# Patient Record
Sex: MALE | Race: WHITE | Employment: FULL TIME | URBAN - NONMETROPOLITAN AREA
[De-identification: names, ages, dates, MRNs, and addresses within clinical notes are randomized per-mention and may not be internally consistent; named-entity substitution may affect disease eponyms.]

---

## 2017-04-18 ENCOUNTER — OFFICE VISIT (OUTPATIENT)
Dept: UROLOGY | Age: 42
End: 2017-04-18
Payer: COMMERCIAL

## 2017-04-18 VITALS
DIASTOLIC BLOOD PRESSURE: 82 MMHG | WEIGHT: 264 LBS | SYSTOLIC BLOOD PRESSURE: 152 MMHG | OXYGEN SATURATION: 97 % | HEIGHT: 66 IN | HEART RATE: 70 BPM | TEMPERATURE: 97.5 F | BODY MASS INDEX: 42.43 KG/M2

## 2017-04-18 DIAGNOSIS — F52.4 PREMATURE EJACULATION: Primary | ICD-10-CM

## 2017-04-18 DIAGNOSIS — N52.9 ERECTILE DYSFUNCTION, UNSPECIFIED ERECTILE DYSFUNCTION TYPE: ICD-10-CM

## 2017-04-18 LAB
BILIRUBIN, POC: NORMAL
BLOOD URINE, POC: NORMAL
CLARITY, POC: NORMAL
COLOR, POC: NORMAL
GLUCOSE URINE, POC: NORMAL
KETONES, POC: NORMAL
LEUKOCYTE EST, POC: NORMAL
NITRITE, POC: NORMAL
PH, POC: 5.5
PROTEIN, POC: NORMAL
SPECIFIC GRAVITY, POC: 1.02
UROBILINOGEN, POC: 0.2

## 2017-04-18 PROCEDURE — 81002 URINALYSIS NONAUTO W/O SCOPE: CPT | Performed by: PHYSICIAN ASSISTANT

## 2017-04-18 PROCEDURE — 99213 OFFICE O/P EST LOW 20 MIN: CPT | Performed by: PHYSICIAN ASSISTANT

## 2017-04-18 ASSESSMENT — ENCOUNTER SYMPTOMS
COUGH: 0
EYE REDNESS: 0
HEMOPTYSIS: 0
EYE DISCHARGE: 0
BLOOD IN STOOL: 0

## 2017-10-18 ENCOUNTER — OFFICE VISIT (OUTPATIENT)
Dept: UROLOGY | Age: 42
End: 2017-10-18
Payer: COMMERCIAL

## 2017-10-18 VITALS
TEMPERATURE: 97.4 F | WEIGHT: 264 LBS | BODY MASS INDEX: 42.43 KG/M2 | HEART RATE: 76 BPM | OXYGEN SATURATION: 95 % | HEIGHT: 66 IN

## 2017-10-18 DIAGNOSIS — F52.4 PREMATURE EJACULATION: Primary | ICD-10-CM

## 2017-10-18 DIAGNOSIS — N52.1 ERECTILE DYSFUNCTION DUE TO DISEASES CLASSIFIED ELSEWHERE: ICD-10-CM

## 2017-10-18 LAB
BILIRUBIN, POC: NORMAL
BLOOD URINE, POC: NORMAL
CLARITY, POC: CLEAR
COLOR, POC: YELLOW
GLUCOSE URINE, POC: NORMAL
KETONES, POC: NORMAL
LEUKOCYTE EST, POC: NORMAL
NITRITE, POC: NORMAL
PH, POC: 5.5
PROTEIN, POC: NORMAL
SPECIFIC GRAVITY, POC: 1.02
UROBILINOGEN, POC: 0.2

## 2017-10-18 PROCEDURE — 81003 URINALYSIS AUTO W/O SCOPE: CPT | Performed by: PHYSICIAN ASSISTANT

## 2017-10-18 PROCEDURE — 99213 OFFICE O/P EST LOW 20 MIN: CPT | Performed by: PHYSICIAN ASSISTANT

## 2017-10-18 ASSESSMENT — ENCOUNTER SYMPTOMS
BLURRED VISION: 0
EYE PAIN: 0
VOMITING: 0
BACK PAIN: 0
SINUS PAIN: 0
ABDOMINAL PAIN: 0
SHORTNESS OF BREATH: 0
WHEEZING: 0

## 2017-10-18 NOTE — PROGRESS NOTES
Jaiden Justice is a 40 y.o. male who presents today   Chief Complaint   Patient presents with    Follow-up     i am here to fu on premature ejaculation      HPI:  Patient here for 6 month follow-up on premature ejaculation and testalgia. He states the Zoloft continues to work well with premature ejaculation. Patient a refill today which admitted. He has have issues with maintaining erection has tried Cialis [place] he stated that the Viagra works best for him I offered prescription for this today but he states he gets it through another pharmacy cheaper. Patient also stated he used to see Dr. Leigh Quarles for testosterone replacement he is going to check Dr. Ki Britton if he is not comfortable treating we can assume care of this. Past Medical History:   Diagnosis Date    Hyperlipidemia     Hypertension     Hypogonadism in male        Past Surgical History:   Procedure Laterality Date    CARPAL TUNNEL RELEASE Bilateral     TONSILLECTOMY AND ADENOIDECTOMY      VASECTOMY         Current Outpatient Prescriptions   Medication Sig Dispense Refill    sertraline (ZOLOFT) 50 MG tablet Take 1 tablet by mouth daily 30 tablet 5    sertraline (ZOLOFT) 50 MG tablet Take 1 tablet by mouth daily 30 tablet 5    Avanafil (STENDRA) 200 MG TABS Take 200 mg by mouth as needed (prn prior to intercourse.) 8 tablet 1    sertraline (ZOLOFT) 50 MG tablet Take 1 tablet by mouth daily 30 tablet 5    NEURONTIN 100 MG capsule Take 1 capsule by mouth 3 times daily 90 capsule 3    SYNTHROID 125 MCG tablet       simvastatin (ZOCOR) 40 MG tablet       valsartan-hydrochlorothiazide (DIOVAN-HCT) 160-12.5 MG per tablet       testosterone cypionate (DEPOTESTOTERONE CYPIONATE) 200 MG/ML injection Inject 200 mg into the muscle once Indications: 1 cc every 2 weeks       No current facility-administered medications for this visit.         Allergies   Allergen Reactions    Aspirin     Penicillins        Social History     Social History    Marital status:      Spouse name: N/A    Number of children: N/A    Years of education: N/A     Social History Main Topics    Smoking status: Never Smoker    Smokeless tobacco: Never Used    Alcohol use Yes    Drug use: No    Sexual activity: Not Asked     Other Topics Concern    None     Social History Narrative    None       Family History   Problem Relation Age of Onset    Diabetes Mother     High Blood Pressure Brother     Stroke Maternal Grandfather     Cancer Neg Hx     Heart Disease Neg Hx     Kidney Disease Neg Hx        REVIEW OF SYSTEMS:  Review of Systems   Constitutional: Negative for malaise/fatigue and weight loss. HENT: Negative for nosebleeds and sinus pain. Eyes: Negative for blurred vision and pain. Respiratory: Negative for shortness of breath and wheezing. Cardiovascular: Negative for palpitations and leg swelling. Gastrointestinal: Negative for abdominal pain and vomiting. Genitourinary: Negative for dysuria, flank pain, frequency, hematuria and urgency. Musculoskeletal: Negative for back pain and myalgias. Skin: Negative for itching and rash. Neurological: Negative for tremors and sensory change. Endo/Heme/Allergies: Negative for environmental allergies and polydipsia. Psychiatric/Behavioral: Negative for hallucinations. The patient is not nervous/anxious. PHYSICAL EXAM:  Pulse 76   Temp 97.4 °F (36.3 °C) (Temporal)   Ht 5' 6\" (1.676 m)   Wt 264 lb (119.7 kg)   SpO2 95%   BMI 42.61 kg/m²   Physical Exam   Constitutional: No distress. HENT:   Mouth/Throat: No oropharyngeal exudate. Eyes: Left eye exhibits no discharge. No scleral icterus. Neck: No JVD present. No tracheal deviation present. No thyromegaly present. Cardiovascular: Exam reveals no gallop and no friction rub. Pulmonary/Chest: No stridor. He has no rales. Abdominal: He exhibits no distension and no mass. There is no rebound and no guarding.    Musculoskeletal: He exhibits no edema. Neurological: No cranial nerve deficit. He exhibits normal muscle tone. Coordination normal.   Skin: He is not diaphoretic. No pallor. DATA:  U/A:    Lab Results   Component Value Date    NITRITE neg 10/18/2017    COLORU yellow 10/18/2017    PROTEINU trace 10/18/2017    PHUR 5.5 10/18/2017    CLARITYU clear 10/18/2017    SPECGRAV 1.025 10/18/2017    LEUKOCYTESUR neg 10/18/2017    BILIRUBINUR small 10/18/2017    BLOODU trace 10/18/2017    GLUCOSEU neg 10/18/2017                1. Premature ejaculation  Doing well on Zoloft 50 mg which I refilled today and will continue.  - POCT Urinalysis no Micro    2. Erectile dysfunction due to diseases classified elsewhere  Done best on Viagra in the past he is going to resume Viagra. Also discussed penile injections and vacuum erect aid device. He is not interested in those options. Orders Placed This Encounter   Procedures    POCT Urinalysis no Micro     Orders Placed This Encounter   Medications    sertraline (ZOLOFT) 50 MG tablet     Sig: Take 1 tablet by mouth daily     Dispense:  30 tablet     Refill:  5       Plan:  Follow up in 6 months with Mary Vaca.

## 2017-10-26 ENCOUNTER — TELEPHONE (OUTPATIENT)
Dept: UROLOGY | Age: 42
End: 2017-10-26

## 2017-10-26 NOTE — TELEPHONE ENCOUNTER
Dr Ember Durham wants us to take over this pt testoterone testing. Does he need to come back sooner than he is jaclyn now? Please call the pt and advise.

## 2017-11-01 ENCOUNTER — OFFICE VISIT (OUTPATIENT)
Dept: UROLOGY | Age: 42
End: 2017-11-01
Payer: COMMERCIAL

## 2017-11-01 VITALS
SYSTOLIC BLOOD PRESSURE: 149 MMHG | WEIGHT: 263 LBS | HEIGHT: 66 IN | BODY MASS INDEX: 42.27 KG/M2 | HEART RATE: 68 BPM | TEMPERATURE: 97.3 F | DIASTOLIC BLOOD PRESSURE: 90 MMHG

## 2017-11-01 DIAGNOSIS — E29.1 HYPOGONADISM MALE: Primary | ICD-10-CM

## 2017-11-01 LAB
BACTERIA URINE, POC: NORMAL
BILIRUBIN URINE: 0 MG/DL
BLOOD, URINE: POSITIVE
CASTS URINE, POC: NORMAL
CLARITY: CLEAR
COLOR: YELLOW
CRYSTALS URINE, POC: NORMAL
EPI CELLS URINE, POC: NORMAL
GLUCOSE URINE: NORMAL
KETONES, URINE: NEGATIVE
LEUKOCYTE EST, POC: NORMAL
NITRITE, URINE: NEGATIVE
PH UA: 5.5 (ref 4.5–8)
PROTEIN UA: NEGATIVE
RBC URINE, POC: 2
SPECIFIC GRAVITY UA: 1.02 (ref 1–1.03)
UROBILINOGEN, URINE: NORMAL
WBC URINE, POC: 0
YEAST URINE, POC: NORMAL

## 2017-11-01 PROCEDURE — 81001 URINALYSIS AUTO W/SCOPE: CPT | Performed by: PHYSICIAN ASSISTANT

## 2017-11-01 PROCEDURE — 99213 OFFICE O/P EST LOW 20 MIN: CPT | Performed by: PHYSICIAN ASSISTANT

## 2017-11-01 ASSESSMENT — ENCOUNTER SYMPTOMS
BACK PAIN: 0
BLURRED VISION: 0
SHORTNESS OF BREATH: 0
VOMITING: 0
ABDOMINAL PAIN: 0
WHEEZING: 0
EYE PAIN: 0
SINUS PAIN: 0

## 2017-11-01 NOTE — PROGRESS NOTES
Social History    Marital status:      Spouse name: N/A    Number of children: N/A    Years of education: N/A     Social History Main Topics    Smoking status: Never Smoker    Smokeless tobacco: Never Used    Alcohol use Yes    Drug use: No    Sexual activity: Not Asked     Other Topics Concern    None     Social History Narrative    None       Family History   Problem Relation Age of Onset    Diabetes Mother     High Blood Pressure Brother     Stroke Maternal Grandfather     Cancer Neg Hx     Heart Disease Neg Hx     Kidney Disease Neg Hx        REVIEW OF SYSTEMS:  Review of Systems   Constitutional: Negative for malaise/fatigue and weight loss. HENT: Negative for nosebleeds and sinus pain. Eyes: Negative for blurred vision and pain. Respiratory: Negative for shortness of breath and wheezing. Cardiovascular: Negative for palpitations and leg swelling. Gastrointestinal: Negative for abdominal pain and vomiting. Genitourinary: Positive for flank pain. Negative for dysuria, frequency, hematuria and urgency. Musculoskeletal: Negative for back pain and myalgias. Skin: Negative for itching and rash. Neurological: Negative for tremors and sensory change. Endo/Heme/Allergies: Negative for environmental allergies and polydipsia. Psychiatric/Behavioral: Negative for hallucinations. The patient is not nervous/anxious. PHYSICAL EXAM:  BP (!) 149/90   Pulse 68   Temp 97.3 °F (36.3 °C) (Temporal)   Ht 5' 6\" (1.676 m)   Wt 263 lb (119.3 kg)   BMI 42.45 kg/m²   Physical Exam   Constitutional: No distress. HENT:   Mouth/Throat: No oropharyngeal exudate. Eyes: Left eye exhibits no discharge. No scleral icterus. Neck: No JVD present. No tracheal deviation present. No thyromegaly present. Cardiovascular: Exam reveals no gallop and no friction rub. Pulmonary/Chest: No stridor. He has no rales. Abdominal: He exhibits no distension and no mass.  There is no rebound and no guarding. Musculoskeletal: He exhibits no edema. Neurological: No cranial nerve deficit. He exhibits normal muscle tone. Coordination normal.   Skin: He is not diaphoretic. No pallor. DATA:  U/A:    Lab Results   Component Value Date    NITRITE neg 10/18/2017    COLORU Yellow 11/01/2017    PROTEINU Negative 11/01/2017    PHUR 5.5 11/01/2017    CLARITYU Clear 11/01/2017    SPECGRAV 1.025 11/01/2017    LEUKOCYTESUR neg 11/01/2017    UROBILINOGEN Normal 11/01/2017    BILIRUBINUR 0 11/01/2017    BILIRUBINUR small 10/18/2017    BLOODU Positive 11/01/2017    GLUCOSEU neg 11/01/2017           1. Hypogonadism male  No refills needed today labs in 3 months and follow-up visit. - POC URINE with Microscopic  - CBC; Future  - PSA, Diagnostic; Future  - Testosterone; Future      Orders Placed This Encounter   Procedures    CBC     Standing Status:   Future     Standing Expiration Date:   11/1/2018    PSA, Diagnostic     Standing Status:   Future     Standing Expiration Date:   11/1/2018    Testosterone     Standing Status:   Future     Standing Expiration Date:   11/1/2018    POC URINE with Microscopic     No orders of the defined types were placed in this encounter. Plan:  Follow up in 3 months.

## 2018-01-15 ENCOUNTER — TELEPHONE (OUTPATIENT)
Dept: UROLOGY | Age: 43
End: 2018-01-15

## 2018-02-01 DIAGNOSIS — E29.1 HYPOGONADISM MALE: ICD-10-CM

## 2018-02-01 LAB
HCT VFR BLD CALC: 50.3 % (ref 42–52)
HEMOGLOBIN: 16.6 G/DL (ref 14–18)
MCH RBC QN AUTO: 29.5 PG (ref 27–31)
MCHC RBC AUTO-ENTMCNC: 33 G/DL (ref 33–37)
MCV RBC AUTO: 89.5 FL (ref 80–94)
PDW BLD-RTO: 12.4 % (ref 11.5–14.5)
PLATELET # BLD: 299 K/UL (ref 130–400)
PMV BLD AUTO: 10.5 FL (ref 9.4–12.4)
PROSTATE SPECIFIC ANTIGEN: 0.97 NG/ML (ref 0–4)
RBC # BLD: 5.62 M/UL (ref 4.7–6.1)
TESTOSTERONE TOTAL: 756.7 NG/DL (ref 249–836)
WBC # BLD: 8.6 K/UL (ref 4.8–10.8)

## 2018-02-08 ENCOUNTER — OFFICE VISIT (OUTPATIENT)
Dept: UROLOGY | Age: 43
End: 2018-02-08
Payer: COMMERCIAL

## 2018-02-08 VITALS
SYSTOLIC BLOOD PRESSURE: 139 MMHG | BODY MASS INDEX: 42.11 KG/M2 | HEIGHT: 66 IN | DIASTOLIC BLOOD PRESSURE: 85 MMHG | HEART RATE: 89 BPM | WEIGHT: 262 LBS | TEMPERATURE: 98.1 F

## 2018-02-08 DIAGNOSIS — E29.1 HYPOGONADISM MALE: Primary | ICD-10-CM

## 2018-02-08 DIAGNOSIS — N52.9 ERECTILE DYSFUNCTION, UNSPECIFIED ERECTILE DYSFUNCTION TYPE: ICD-10-CM

## 2018-02-08 LAB
APPEARANCE FLUID: CLEAR
BILIRUBIN, POC: 0
BLOOD URINE, POC: NORMAL
CLARITY, POC: CLEAR
COLOR, POC: YELLOW
GLUCOSE URINE, POC: NORMAL
KETONES, POC: NORMAL
LEUKOCYTE EST, POC: NORMAL
NITRITE, POC: NORMAL
PH, POC: 5.5
PROTEIN, POC: NORMAL
SPECIFIC GRAVITY, POC: 1.02
UROBILINOGEN, POC: 0.2

## 2018-02-08 PROCEDURE — 99213 OFFICE O/P EST LOW 20 MIN: CPT | Performed by: PHYSICIAN ASSISTANT

## 2018-02-08 RX ORDER — SILDENAFIL CITRATE 20 MG/1
20 TABLET ORAL PRN
Qty: 8 TABLET | Refills: 1 | Status: SHIPPED | OUTPATIENT
Start: 2018-02-08 | End: 2020-07-24

## 2018-02-08 RX ORDER — LEVOTHYROXINE SODIUM 137 MCG
TABLET ORAL
COMMUNITY
Start: 2018-01-06 | End: 2020-06-12

## 2018-02-08 ASSESSMENT — ENCOUNTER SYMPTOMS
BACK PAIN: 0
SHORTNESS OF BREATH: 0
SINUS PAIN: 0
EYE PAIN: 0
ABDOMINAL PAIN: 0
WHEEZING: 0
VOMITING: 0
BLURRED VISION: 0

## 2018-02-08 NOTE — PROGRESS NOTES
CBC; Future    2. Erectile dysfunction, unspecified erectile dysfunction type  We'll try sildenafil 20 mg prescription. Orders Placed This Encounter   Procedures    PSA Screening     Standing Status:   Future     Standing Expiration Date:   2/8/2019    Testosterone     Standing Status:   Future     Standing Expiration Date:   2/8/2019    CBC     Standing Status:   Future     Standing Expiration Date:   2/8/2019    POCT Urinalysis no Micro     Orders Placed This Encounter   Medications    sildenafil (REVATIO) 20 MG tablet     Sig: Take 1 tablet by mouth as needed (prior to sexual acitvity.)     Dispense:  8 tablet     Refill:  1     Plan:  Follow-up in 6 months with labs.

## 2018-08-03 DIAGNOSIS — E29.1 HYPOGONADISM MALE: ICD-10-CM

## 2018-08-03 LAB
HCT VFR BLD CALC: 55.5 % (ref 42–52)
HEMOGLOBIN: 18 G/DL (ref 14–18)
MCH RBC QN AUTO: 29.8 PG (ref 27–31)
MCHC RBC AUTO-ENTMCNC: 32.4 G/DL (ref 33–37)
MCV RBC AUTO: 91.7 FL (ref 80–94)
PDW BLD-RTO: 13.5 % (ref 11.5–14.5)
PLATELET # BLD: 341 K/UL (ref 130–400)
PMV BLD AUTO: 10.4 FL (ref 9.4–12.4)
PROSTATE SPECIFIC ANTIGEN: 0.77 NG/ML (ref 0–4)
RBC # BLD: 6.05 M/UL (ref 4.7–6.1)
TESTOSTERONE TOTAL: 857.4 NG/DL (ref 249–836)
WBC # BLD: 11.6 K/UL (ref 4.8–10.8)

## 2018-08-15 ENCOUNTER — OFFICE VISIT (OUTPATIENT)
Dept: UROLOGY | Age: 43
End: 2018-08-15
Payer: COMMERCIAL

## 2018-08-15 VITALS
BODY MASS INDEX: 42.59 KG/M2 | SYSTOLIC BLOOD PRESSURE: 132 MMHG | WEIGHT: 265 LBS | DIASTOLIC BLOOD PRESSURE: 77 MMHG | HEART RATE: 71 BPM | TEMPERATURE: 97 F | HEIGHT: 66 IN

## 2018-08-15 DIAGNOSIS — E29.1 HYPOGONADISM MALE: Primary | ICD-10-CM

## 2018-08-15 LAB
BILIRUBIN, POC: 1
BLOOD URINE, POC: NORMAL
CLARITY, POC: CLEAR
COLOR, POC: YELLOW
GLUCOSE URINE, POC: NORMAL
KETONES, POC: NORMAL
LEUKOCYTE EST, POC: NORMAL
NITRITE, POC: NORMAL
PH, POC: 5.5
PROTEIN, POC: NORMAL
SPECIFIC GRAVITY, POC: 1.02
UROBILINOGEN, POC: 0.2

## 2018-08-15 PROCEDURE — 81003 URINALYSIS AUTO W/O SCOPE: CPT | Performed by: PHYSICIAN ASSISTANT

## 2018-08-15 PROCEDURE — 99213 OFFICE O/P EST LOW 20 MIN: CPT | Performed by: PHYSICIAN ASSISTANT

## 2018-08-15 RX ORDER — LOSARTAN POTASSIUM AND HYDROCHLOROTHIAZIDE 25; 100 MG/1; MG/1
1 TABLET ORAL
COMMUNITY
Start: 2018-07-24

## 2018-08-15 ASSESSMENT — ENCOUNTER SYMPTOMS
ABDOMINAL PAIN: 0
BACK PAIN: 0
WHEEZING: 0
SINUS PAIN: 0
VOMITING: 0
EYE PAIN: 0
SHORTNESS OF BREATH: 0
BLURRED VISION: 0

## 2018-08-15 NOTE — PROGRESS NOTES
Chandan Mason is a 40 y.o. male who presents today   Chief Complaint   Patient presents with    Follow-up     6 month follow up hypogonadism  labs done      Follow up hypogonadism:  Patient is a 50-year-old gentleman with several year history of hypogonadism managed with IM testosterone injections. He does these at home. His main complaints were daytime fatigue and lack of energy as well as decreased libido which have improved with testosterone replacement. Most recent labs reveal testosterone of 857, hemoglobin 18.0 with a hematocrit of 55.5 which is elevated PSA was 0.77 which is a decrease from previous. Past Medical History:   Diagnosis Date    Hyperlipidemia     Hypertension     Hypogonadism in male        Past Surgical History:   Procedure Laterality Date    CARPAL TUNNEL RELEASE Bilateral     TONSILLECTOMY AND ADENOIDECTOMY      VASECTOMY         Current Outpatient Prescriptions   Medication Sig Dispense Refill    losartan-hydrochlorothiazide (HYZAAR) 100-25 MG per tablet Take 1 tablet by mouth      SYNTHROID 137 MCG tablet       sildenafil (REVATIO) 20 MG tablet Take 1 tablet by mouth as needed (prior to sexual acitvity.) 8 tablet 1    Avanafil (STENDRA) 200 MG TABS Take 200 mg by mouth as needed (prn prior to intercourse.) 8 tablet 1    sertraline (ZOLOFT) 50 MG tablet Take 1 tablet by mouth daily 30 tablet 5    NEURONTIN 100 MG capsule Take 1 capsule by mouth 3 times daily 90 capsule 3    simvastatin (ZOCOR) 40 MG tablet       testosterone cypionate (DEPOTESTOTERONE CYPIONATE) 200 MG/ML injection Inject 200 mg into the muscle once Indications: 1 cc every 2 weeks      sertraline (ZOLOFT) 50 MG tablet TAKE ONE TABLET DAILY 30 tablet 5    sertraline (ZOLOFT) 50 MG tablet Take 1 tablet by mouth daily 30 tablet 5    SYNTHROID 125 MCG tablet       valsartan-hydrochlorothiazide (DIOVAN-HCT) 160-12.5 MG per tablet        No current facility-administered medications for this visit.

## 2018-09-17 ENCOUNTER — OFFICE VISIT (OUTPATIENT)
Dept: UROLOGY | Age: 43
End: 2018-09-17
Payer: COMMERCIAL

## 2018-09-17 VITALS
WEIGHT: 269 LBS | SYSTOLIC BLOOD PRESSURE: 139 MMHG | DIASTOLIC BLOOD PRESSURE: 87 MMHG | HEART RATE: 74 BPM | BODY MASS INDEX: 43.23 KG/M2 | HEIGHT: 66 IN | TEMPERATURE: 97.1 F

## 2018-09-17 DIAGNOSIS — E29.1 HYPOGONADISM MALE: Primary | ICD-10-CM

## 2018-09-17 LAB
BILIRUBIN, POC: NORMAL
BLOOD URINE, POC: NORMAL
CLARITY, POC: CLEAR
COLOR, POC: YELLOW
GLUCOSE URINE, POC: NORMAL
KETONES, POC: NORMAL
LEUKOCYTE EST, POC: NORMAL
NITRITE, POC: NORMAL
PH, POC: 6
PROTEIN, POC: NORMAL
SPECIFIC GRAVITY, POC: 1.02
UROBILINOGEN, POC: 0.2

## 2018-09-17 PROCEDURE — 81003 URINALYSIS AUTO W/O SCOPE: CPT | Performed by: PHYSICIAN ASSISTANT

## 2018-09-17 PROCEDURE — 99213 OFFICE O/P EST LOW 20 MIN: CPT | Performed by: PHYSICIAN ASSISTANT

## 2018-09-17 ASSESSMENT — ENCOUNTER SYMPTOMS
WHEEZING: 0
BLURRED VISION: 0
NAUSEA: 0
DOUBLE VISION: 0
HEARTBURN: 0
SORE THROAT: 0
SHORTNESS OF BREATH: 0

## 2018-09-17 NOTE — PROGRESS NOTES
tablet       testosterone cypionate (DEPOTESTOTERONE CYPIONATE) 200 MG/ML injection Inject 200 mg into the muscle once Indications: 1 cc every 2 weeks       No current facility-administered medications for this visit. Allergies   Allergen Reactions    Aspirin     Penicillins        Social History     Social History    Marital status:      Spouse name: N/A    Number of children: N/A    Years of education: N/A     Social History Main Topics    Smoking status: Never Smoker    Smokeless tobacco: Never Used    Alcohol use Yes    Drug use: No    Sexual activity: Not Asked     Other Topics Concern    None     Social History Narrative    None       Family History   Problem Relation Age of Onset    Diabetes Mother     High Blood Pressure Brother     Stroke Maternal Grandfather     Cancer Neg Hx     Heart Disease Neg Hx     Kidney Disease Neg Hx        REVIEW OF SYSTEMS:  Review of Systems   Constitutional: Negative for chills and fever. HENT: Negative for congestion and sore throat. Eyes: Negative for blurred vision and double vision. Respiratory: Negative for shortness of breath and wheezing. Cardiovascular: Negative for chest pain and palpitations. Gastrointestinal: Negative for heartburn and nausea. Genitourinary: Negative for dysuria, flank pain, frequency, hematuria and urgency. Musculoskeletal: Negative for falls and neck pain. Skin: Negative for itching and rash. Neurological: Negative for dizziness and tingling. Endo/Heme/Allergies: Does not bruise/bleed easily. Psychiatric/Behavioral: The patient does not have insomnia. PHYSICAL EXAM:  /87   Pulse 74   Temp 97.1 °F (36.2 °C) (Temporal)   Ht 5' 6\" (1.676 m)   Wt 269 lb (122 kg)   BMI 43.42 kg/m²   Physical Exam   Constitutional: No distress. HENT:   Mouth/Throat: No oropharyngeal exudate. Eyes: Left eye exhibits no discharge. No scleral icterus. Neck: No JVD present.  No tracheal deviation present. No thyromegaly present. Cardiovascular: Exam reveals no gallop and no friction rub. Pulmonary/Chest: No stridor. He has no rales. Abdominal: He exhibits no distension and no mass. There is no rebound and no guarding. Musculoskeletal: He exhibits no edema. Neurological: No cranial nerve deficit. He exhibits normal muscle tone. Coordination normal.   Skin: He is not diaphoretic. No pallor. DATA:  U/A:    Lab Results   Component Value Date    NITRITE neg 09/17/2018    COLORU yellow 09/17/2018    COLORU Yellow 11/01/2017    PROTEINU neg 09/17/2018    PROTEINU Negative 11/01/2017    PHUR 6.0 09/17/2018    PHUR 5.5 11/01/2017    RBCUA 2 11/01/2017    CLARITYU clear 09/17/2018    CLARITYU Clear 11/01/2017    SPECGRAV 1.020 09/17/2018    SPECGRAV 1.025 11/01/2017    LEUKOCYTESUR neg 09/17/2018    UROBILINOGEN Normal 11/01/2017    BILIRUBINUR neg 09/17/2018    BLOODU neg 09/17/2018    BLOODU Positive 11/01/2017    GLUCOSEU neg 09/17/2018    GLUCOSEU neg 11/01/2017           1. Hypogonadism male  Repeat hemoglobin and hematocrit are down normal hemoglobin 16 hematocrit was 47.3. We'll continue current IM testosterone every 2 weeks. Other labs were normal in follow-up in 6 months with repeat labs. - POCT Urinalysis No Micro (Auto)  - PSA, Diagnostic; Future  - Hemoglobin and Hematocrit, Blood; Future  - Testosterone; Future      Orders Placed This Encounter   Procedures    PSA, Diagnostic     Standing Status:   Future     Standing Expiration Date:   9/17/2019    Hemoglobin and Hematocrit, Blood     Standing Status:   Future     Standing Expiration Date:   9/17/2019    Testosterone     Standing Status:   Future     Standing Expiration Date:   9/17/2019    POCT Urinalysis No Micro (Auto)     No orders of the defined types were placed in this encounter. Plan:  Follow-up in 6 months with labs.

## 2018-10-10 ENCOUNTER — TELEPHONE (OUTPATIENT)
Dept: UROLOGY | Age: 43
End: 2018-10-10

## 2018-10-10 NOTE — TELEPHONE ENCOUNTER
Pharmacy called to get refill on testosterone inj and syringes. Approved 6mo supply for both. Inject 1ml every 2 weeks.

## 2019-01-28 DIAGNOSIS — N52.9 ERECTILE DYSFUNCTION, UNSPECIFIED ERECTILE DYSFUNCTION TYPE: Primary | ICD-10-CM

## 2019-03-18 DIAGNOSIS — E29.1 HYPOGONADISM MALE: ICD-10-CM

## 2019-03-18 LAB
HCT VFR BLD CALC: 51.9 % (ref 42–52)
HEMOGLOBIN: 16.8 G/DL (ref 14–18)
PROSTATE SPECIFIC ANTIGEN: 0.76 NG/ML (ref 0–4)
TESTOSTERONE TOTAL: 540.3 NG/DL (ref 249–836)

## 2019-03-26 ENCOUNTER — OFFICE VISIT (OUTPATIENT)
Dept: UROLOGY | Age: 44
End: 2019-03-26
Payer: COMMERCIAL

## 2019-03-26 VITALS — TEMPERATURE: 98.2 F | BODY MASS INDEX: 42.75 KG/M2 | HEIGHT: 66 IN | WEIGHT: 266 LBS

## 2019-03-26 DIAGNOSIS — E29.1 HYPOGONADISM MALE: Primary | ICD-10-CM

## 2019-03-26 DIAGNOSIS — N52.9 ERECTILE DYSFUNCTION, UNSPECIFIED ERECTILE DYSFUNCTION TYPE: ICD-10-CM

## 2019-03-26 LAB
BILIRUBIN, POC: 0
BLOOD URINE, POC: NORMAL
CLARITY, POC: CLEAR
COLOR, POC: YELLOW
GLUCOSE URINE, POC: NORMAL
KETONES, POC: NORMAL
LEUKOCYTE EST, POC: NORMAL
NITRITE, POC: NORMAL
PH, POC: 7
PROTEIN, POC: NORMAL
SPECIFIC GRAVITY, POC: 1.02
UROBILINOGEN, POC: 0.2

## 2019-03-26 PROCEDURE — 99214 OFFICE O/P EST MOD 30 MIN: CPT | Performed by: PHYSICIAN ASSISTANT

## 2019-03-26 PROCEDURE — 81003 URINALYSIS AUTO W/O SCOPE: CPT | Performed by: PHYSICIAN ASSISTANT

## 2019-03-26 ASSESSMENT — ENCOUNTER SYMPTOMS
BACK PAIN: 0
EYE PAIN: 0
SHORTNESS OF BREATH: 0
ABDOMINAL PAIN: 0
WHEEZING: 0
SINUS PAIN: 0
VOMITING: 0

## 2019-08-13 DIAGNOSIS — N52.9 ERECTILE DYSFUNCTION, UNSPECIFIED ERECTILE DYSFUNCTION TYPE: ICD-10-CM

## 2019-09-24 DIAGNOSIS — E29.1 HYPOGONADISM MALE: ICD-10-CM

## 2019-09-24 LAB
HCT VFR BLD CALC: 47.8 % (ref 42–52)
HEMOGLOBIN: 15.8 G/DL (ref 14–18)
TESTOSTERONE TOTAL: 632.9 NG/DL (ref 249–836)

## 2019-10-03 ENCOUNTER — OFFICE VISIT (OUTPATIENT)
Dept: UROLOGY | Age: 44
End: 2019-10-03
Payer: COMMERCIAL

## 2019-10-03 VITALS — HEIGHT: 66 IN | BODY MASS INDEX: 42.27 KG/M2 | TEMPERATURE: 97.8 F | WEIGHT: 263 LBS

## 2019-10-03 DIAGNOSIS — E29.1 HYPOGONADISM MALE: Primary | ICD-10-CM

## 2019-10-03 DIAGNOSIS — N52.9 ERECTILE DYSFUNCTION, UNSPECIFIED ERECTILE DYSFUNCTION TYPE: ICD-10-CM

## 2019-10-03 PROCEDURE — 99214 OFFICE O/P EST MOD 30 MIN: CPT | Performed by: PHYSICIAN ASSISTANT

## 2019-10-03 RX ORDER — SILDENAFIL 50 MG/1
100 TABLET, FILM COATED ORAL DAILY PRN
Qty: 8 TABLET | Refills: 1 | Status: SHIPPED | OUTPATIENT
Start: 2019-10-03 | End: 2020-07-24

## 2019-10-03 ASSESSMENT — ENCOUNTER SYMPTOMS
SORE THROAT: 0
CONSTIPATION: 0
FACIAL SWELLING: 0
WHEEZING: 0
EYE REDNESS: 0
RHINORRHEA: 0
EYE PAIN: 0
BACK PAIN: 0
SINUS PRESSURE: 0
ABDOMINAL DISTENTION: 0
COLOR CHANGE: 0
COUGH: 0
ABDOMINAL PAIN: 0
NAUSEA: 0
BLOOD IN STOOL: 0
SHORTNESS OF BREATH: 0
VOMITING: 0
EYE DISCHARGE: 0
DIARRHEA: 0

## 2020-06-05 DIAGNOSIS — E29.1 HYPOGONADISM MALE: ICD-10-CM

## 2020-06-05 LAB
HCT VFR BLD CALC: 50.5 % (ref 42–52)
HEMOGLOBIN: 16.9 G/DL (ref 14–18)
PROSTATE SPECIFIC ANTIGEN: 0.85 NG/ML (ref 0–4)
TESTOSTERONE TOTAL: 133.4 NG/DL (ref 249–836)

## 2020-06-12 ENCOUNTER — OFFICE VISIT (OUTPATIENT)
Dept: UROLOGY | Age: 45
End: 2020-06-12
Payer: COMMERCIAL

## 2020-06-12 VITALS — BODY MASS INDEX: 42.43 KG/M2 | WEIGHT: 264 LBS | HEIGHT: 66 IN | TEMPERATURE: 97.9 F

## 2020-06-12 LAB
BILIRUBIN, POC: NORMAL
BLOOD URINE, POC: NORMAL
CLARITY, POC: CLEAR
COLOR, POC: YELLOW
GLUCOSE URINE, POC: NORMAL
KETONES, POC: NORMAL
LEUKOCYTE EST, POC: NORMAL
NITRITE, POC: NORMAL
PH, POC: 5.5
PROTEIN, POC: NORMAL
SPECIFIC GRAVITY, POC: 1.03
UROBILINOGEN, POC: 0.2

## 2020-06-12 PROCEDURE — 81003 URINALYSIS AUTO W/O SCOPE: CPT | Performed by: NURSE PRACTITIONER

## 2020-06-12 PROCEDURE — 99213 OFFICE O/P EST LOW 20 MIN: CPT | Performed by: NURSE PRACTITIONER

## 2020-06-12 RX ORDER — TESTOSTERONE CYPIONATE 200 MG/ML
VIAL (ML) INTRAMUSCULAR
Qty: 5 ML | Refills: 3 | Status: SHIPPED | OUTPATIENT
Start: 2020-06-12

## 2020-06-12 RX ORDER — EZETIMIBE 10 MG/1
TABLET ORAL
COMMUNITY
Start: 2020-05-15

## 2020-06-12 ASSESSMENT — ENCOUNTER SYMPTOMS: SHORTNESS OF BREATH: 0

## 2020-06-12 NOTE — PROGRESS NOTES
tablets by mouth daily as needed for Erectile Dysfunction (Patient not taking: Reported on 6/12/2020) 8 tablet 1    sertraline (ZOLOFT) 50 MG tablet TAKE ONE TABLET DAILY (Patient not taking: Reported on 6/12/2020) 30 tablet 5    sildenafil (REVATIO) 20 MG tablet Take 1 tablet by mouth as needed (prior to sexual acitvity.) (Patient not taking: Reported on 6/12/2020) 8 tablet 1    NEURONTIN 100 MG capsule Take 1 capsule by mouth 3 times daily (Patient not taking: Reported on 6/12/2020) 90 capsule 3     No current facility-administered medications for this visit. Allergies   Allergen Reactions    Aspirin     Penicillins          Subjective:      Review of Systems   Constitutional: Negative for chills and fever. Respiratory: Negative for shortness of breath. Genitourinary: Negative for difficulty urinating, dysuria and hematuria. All other systems reviewed and are negative. Objective:     Physical Exam  Vitals signs reviewed. Constitutional:       General: He is not in acute distress. Appearance: He is well-developed. HENT:      Head: Normocephalic and atraumatic. Eyes:      Conjunctiva/sclera: Conjunctivae normal.      Pupils: Pupils are equal, round, and reactive to light. Neck:      Musculoskeletal: Normal range of motion and neck supple. Cardiovascular:      Rate and Rhythm: Normal rate. Pulmonary:      Effort: Pulmonary effort is normal. No respiratory distress. Abdominal:      Palpations: Abdomen is soft. Musculoskeletal: Normal range of motion. Skin:     General: Skin is warm and dry. Neurological:      Mental Status: He is alert and oriented to person, place, and time. Psychiatric:         Behavior: Behavior normal.         Thought Content:  Thought content normal.         Judgment: Judgment normal.       Temp 97.9 °F (36.6 °C) (Temporal)   Ht 5' 6\" (1.676 m)   Wt 264 lb (119.7 kg)   BMI 42.61 kg/m²       DATA:  Results for orders placed or performed in visit on

## 2020-07-24 ENCOUNTER — OFFICE VISIT (OUTPATIENT)
Dept: UROLOGY | Age: 45
End: 2020-07-24
Payer: COMMERCIAL

## 2020-07-24 VITALS — TEMPERATURE: 98.1 F | BODY MASS INDEX: 43.23 KG/M2 | WEIGHT: 269 LBS | HEIGHT: 66 IN

## 2020-07-24 PROCEDURE — 81003 URINALYSIS AUTO W/O SCOPE: CPT | Performed by: NURSE PRACTITIONER

## 2020-07-24 PROCEDURE — 99213 OFFICE O/P EST LOW 20 MIN: CPT | Performed by: NURSE PRACTITIONER

## 2020-07-24 RX ORDER — AVANAFIL 200 MG/1
200 TABLET ORAL PRN
Qty: 8 TABLET | Refills: 1 | Status: SHIPPED | OUTPATIENT
Start: 2020-07-24

## 2020-07-24 NOTE — PROGRESS NOTES
100-25 MG per tablet Take 1 tablet by mouth      sertraline (ZOLOFT) 50 MG tablet Take 1 tablet by mouth daily 30 tablet 5    sertraline (ZOLOFT) 50 MG tablet Take 1 tablet by mouth daily 30 tablet 5    SYNTHROID 125 MCG tablet       simvastatin (ZOCOR) 40 MG tablet       testosterone cypionate (DEPOTESTOTERONE CYPIONATE) 200 MG/ML injection Inject 200 mg into the muscle once Indications: 1 cc every 2 weeks       No current facility-administered medications for this visit. Allergies   Allergen Reactions    Aspirin     Penicillins          Subjective:      Review of Systems   Constitutional: Negative for chills and fever. Genitourinary: Negative for difficulty urinating, frequency, hematuria, testicular pain and urgency. All other systems reviewed and are negative. Objective:     Physical Exam  Vitals signs reviewed. Constitutional:       General: He is not in acute distress. Appearance: He is well-developed. HENT:      Head: Normocephalic and atraumatic. Eyes:      Conjunctiva/sclera: Conjunctivae normal.      Pupils: Pupils are equal, round, and reactive to light. Neck:      Musculoskeletal: Normal range of motion and neck supple. Cardiovascular:      Rate and Rhythm: Normal rate. Pulmonary:      Effort: Pulmonary effort is normal. No respiratory distress. Abdominal:      Palpations: Abdomen is soft. Musculoskeletal: Normal range of motion. Skin:     General: Skin is warm and dry. Neurological:      Mental Status: He is alert and oriented to person, place, and time. Psychiatric:         Behavior: Behavior normal.         Thought Content:  Thought content normal.         Judgment: Judgment normal.       Temp 98.1 °F (36.7 °C) (Temporal)   Ht 5' 6\" (1.676 m)   Wt 269 lb (122 kg)   BMI 43.42 kg/m²       DATA:  Results for orders placed or performed in visit on 07/24/20   POCT Urinalysis No Micro (Auto)   Result Value Ref Range    Color, UA yellow     Clarity, UA clear Glucose, UA POC neg     Bilirubin, UA small     Ketones, UA trace     Spec Grav, UA 1.020     Blood, UA POC trace     pH, UA 5.5     Protein, UA POC trace     Urobilinogen, UA 0.2     Leukocytes, UA neg     Nitrite, UA neg      Lab Results   Component Value Date    PSA 0.85 06/05/2020    PSA 0.76 03/18/2019    PSA 0.77 08/03/2018         Assessment/ Plan       Diagnosis Orders   1. Hypogonadism male  POCT Urinalysis No Micro (Auto)    Hemoglobin and Hematocrit, Blood    Testosterone    PSA, Diagnostic   2. Other male erectile dysfunction     3. Premature ejaculation  Avanafil (STENDRA) 200 MG TABS   4. Erectile dysfunction, unspecified erectile dysfunction type  Avanafil (STENDRA) 200 MG TABS   Will reorder patient's avanafil. He can follow-up in 6 months with labs. Discussed use, benefit, and side effects of prescribed medications. All patient questions answered. Pt voiced understanding. Patient agreed with treatment plan. Electronically signed by JORGE Echeverria on 7/24/2020 at 3:58 PM     EMR dragon/transcription disclaimer: Much of this encounter note is electronic transcription/translation of spoken language to printed tach. Electronic translation of spoken language may be erroneous, or at times, nonsensical words or phrases may be inadvertently transcribed.  Although, I have reviewed the note for such errors, some may still exist.

## 2021-01-18 DIAGNOSIS — E29.1 HYPOGONADISM MALE: ICD-10-CM

## 2021-01-18 LAB
HCT VFR BLD CALC: 52.2 % (ref 42–52)
HEMOGLOBIN: 17.2 G/DL (ref 14–18)
PROSTATE SPECIFIC ANTIGEN: 0.76 NG/ML (ref 0–4)
TESTOSTERONE TOTAL: 910.5 NG/DL (ref 249–836)

## 2021-01-22 ENCOUNTER — OFFICE VISIT (OUTPATIENT)
Dept: UROLOGY | Age: 46
End: 2021-01-22
Payer: COMMERCIAL

## 2021-01-22 VITALS — TEMPERATURE: 97.9 F | BODY MASS INDEX: 42.11 KG/M2 | HEIGHT: 66 IN | WEIGHT: 262 LBS

## 2021-01-22 DIAGNOSIS — E29.1 HYPOGONADISM MALE: Primary | ICD-10-CM

## 2021-01-22 PROCEDURE — 81003 URINALYSIS AUTO W/O SCOPE: CPT | Performed by: NURSE PRACTITIONER

## 2021-01-22 PROCEDURE — 99213 OFFICE O/P EST LOW 20 MIN: CPT | Performed by: NURSE PRACTITIONER

## 2021-01-22 ASSESSMENT — ENCOUNTER SYMPTOMS
COLOR CHANGE: 0
ABDOMINAL PAIN: 0
CONSTIPATION: 0
NAUSEA: 0
WHEEZING: 0
VOMITING: 0
SHORTNESS OF BREATH: 0
ABDOMINAL DISTENTION: 0

## 2021-01-22 NOTE — PROGRESS NOTES
Armando Garcia is a 41 y.o., male, Established patient who presents today   Chief Complaint   Patient presents with    Follow-up     I am here today for my 6 month follow up with labs. KRYS Jaime is a 67-year-old male who presents to the office with hypogonadism. Patient is currently taking 1 cc (200 mg) testosterone IM every 2 weeks. He states that his fatigue and lack of energy has overall improved. Patient's labs were drawn on 1/18/2021 his testosterone was 910.5, hemoglobin 17.2, hematocrit 52.2, PSA 0.76. He explained that his testerone level was drawn soon after his injection. He also has a history of erectile dysfunction. Patient was unable to afford Stendra, and Viagra did not help. Past Medical History:   Diagnosis Date    Hyperlipidemia     Hypertension     Hypogonadism in male        Past Surgical History:   Procedure Laterality Date    CARPAL TUNNEL RELEASE Bilateral     TONSILLECTOMY AND ADENOIDECTOMY      VASECTOMY         Current Outpatient Medications   Medication Sig Dispense Refill    Avanafil (STENDRA) 200 MG TABS Take 200 mg by mouth as needed (prn prior to intercourse.) 8 tablet 1    ezetimibe (ZETIA) 10 MG tablet       Testosterone Cypionate 200 MG/ML SOLN Inject 1ml IM every two weeks. 5 mL 3    SYRINGE DISPOSABLE 3CC 3 ML MISC 2 each by Does not apply route as needed USE AS DIRECTED; PULL UP WITH 18G, INJECT WITH 23.      losartan-hydrochlorothiazide (HYZAAR) 100-25 MG per tablet Take 1 tablet by mouth      sertraline (ZOLOFT) 50 MG tablet Take 1 tablet by mouth daily 30 tablet 5    SYNTHROID 125 MCG tablet       simvastatin (ZOCOR) 40 MG tablet        No current facility-administered medications for this visit.         Allergies   Allergen Reactions    Aspirin     Penicillins        Social History     Socioeconomic History    Marital status:      Spouse name: None    Number of children: None    Years of education: None  Highest education level: None   Occupational History    None   Social Needs    Financial resource strain: None    Food insecurity     Worry: None     Inability: None    Transportation needs     Medical: None     Non-medical: None   Tobacco Use    Smoking status: Never Smoker    Smokeless tobacco: Never Used   Substance and Sexual Activity    Alcohol use: Yes    Drug use: No    Sexual activity: None   Lifestyle    Physical activity     Days per week: None     Minutes per session: None    Stress: None   Relationships    Social connections     Talks on phone: None     Gets together: None     Attends Muslim service: None     Active member of club or organization: None     Attends meetings of clubs or organizations: None     Relationship status: None    Intimate partner violence     Fear of current or ex partner: None     Emotionally abused: None     Physically abused: None     Forced sexual activity: None   Other Topics Concern    None   Social History Narrative    None       Family History   Problem Relation Age of Onset    Diabetes Mother     High Blood Pressure Brother     Stroke Maternal Grandfather     Cancer Neg Hx     Heart Disease Neg Hx     Kidney Disease Neg Hx        REVIEW OF SYSTEMS:  Review of Systems   Constitutional: Negative for appetite change, chills, fever and unexpected weight change. HENT: Negative for congestion and hearing loss. Respiratory: Negative for shortness of breath and wheezing. Cardiovascular: Negative for chest pain. Gastrointestinal: Negative for abdominal distention, abdominal pain, constipation, nausea and vomiting. Genitourinary: Negative for difficulty urinating, dysuria, flank pain, frequency, hematuria, penile pain, testicular pain and urgency. Musculoskeletal: Negative for arthralgias and gait problem. Skin: Negative for color change. Neurological: Negative for dizziness, syncope, weakness, light-headedness, numbness and headaches. Psychiatric/Behavioral: Negative for behavioral problems. The patient is not nervous/anxious. PHYSICAL EXAM:  Temp 97.9 °F (36.6 °C) (Temporal)   Ht 5' 6\" (1.676 m)   Wt 262 lb (118.8 kg)   BMI 42.29 kg/m²   Physical Exam  Constitutional:       General: He is not in acute distress. Appearance: Normal appearance. He is not toxic-appearing. HENT:      Head: Normocephalic. Nose: Nose normal.   Neck:      Musculoskeletal: Normal range of motion. Cardiovascular:      Rate and Rhythm: Normal rate. Pulmonary:      Effort: Pulmonary effort is normal. No respiratory distress. Breath sounds: No wheezing. Abdominal:      General: There is no distension. Palpations: Abdomen is soft. Tenderness: There is no abdominal tenderness. There is no right CVA tenderness or left CVA tenderness. Genitourinary:     Penis: Normal.    Musculoskeletal: Normal range of motion. Skin:     General: Skin is warm and dry. Neurological:      Mental Status: He is alert and oriented to person, place, and time. Motor: No weakness.       Gait: Gait normal.   Psychiatric:         Mood and Affect: Mood normal.         Behavior: Behavior normal.         DATA:  Hemoglobin/Hematocrit:    Lab Results   Component Value Date    HGB 17.2 01/18/2021    HCT 52.2 01/18/2021     PSA:   Lab Results   Component Value Date    PSA 0.76 01/18/2021     Results for orders placed or performed in visit on 01/22/21   POCT Urinalysis No Micro (Auto)   Result Value Ref Range    Color, UA yellow     Clarity, UA clear     Glucose, UA POC neg     Bilirubin, UA neg     Ketones, UA neg     Spec Grav, UA 1.025     Blood, UA POC neg     pH, UA 6.0     Protein, UA POC neg     Urobilinogen, UA 0.2     Leukocytes, UA neg     Nitrite, UA neg      Lab Results   Component Value Date    PSA 0.76 01/18/2021    PSA 0.85 06/05/2020    PSA 0.76 03/18/2019 Testosterone 910. 5High   249.0 - 836.0 ng/dL Final 01/18/2021  9:14 AM  - [facility]         1. Hypogonadism male  Symptoms overall improved. Testosterone may be falsely elevated related to timing of injection and blood draw. He will inject his testosterone tonight, and repeat his testosterone level in 6 days which is almost midcycle, before receiving a refill. If testosterone is still elevated will adjust dose. Discussed therapeutic phlebotomy to decrease hemoglobin/hematocrit. He will give blood this coming week and repeat his H&H when he repeats his testosterone level. We will follow up in 3 months with labs prior. Discussed side effects of high testosterone level, and therapeutic effects of phlebotomy. - POCT Urinalysis No Micro (Auto)  - Hemoglobin and Hematocrit, Blood; Future  - Testosterone; Future  - Hemoglobin and Hematocrit, Blood; Future  - Testosterone; Future  - PSA, Diagnostic; Future      Orders Placed This Encounter   Procedures    Hemoglobin and Hematocrit, Blood     Standing Status:   Future     Standing Expiration Date:   1/22/2022    Testosterone     Standing Status:   Future     Standing Expiration Date:   1/22/2022    Hemoglobin and Hematocrit, Blood     Standing Status:   Future     Standing Expiration Date:   1/22/2022    Testosterone     Standing Status:   Future     Standing Expiration Date:   1/22/2022    PSA, Diagnostic     Standing Status:   Future     Standing Expiration Date:   1/22/2022    POCT Urinalysis No Micro (Auto)        Return in about 3 months (around 4/22/2021) for get labs on Thurs 1/28/21 and f/u in office in 3 months with labs prior. Hezekiah Meigs, APRN - CNP      All information inputted into the note by the MA to include chief complaint, past medical history, past surgical history, medications, allergies, social and family history and review of systems has been reviewed and updated as needed by me. [employer] Dragon/transcription disclaimer: Much of this documentt is electronic  transcription/translation of spoken language to printed text. The  electronic translation of spoken language may be erroneous, or at times,  nonsensical words or phrases may be inadvertently transcribed.  Although I  have reviewed the document for such errors, some may still exist.

## 2021-01-28 DIAGNOSIS — E29.1 HYPOGONADISM MALE: Primary | ICD-10-CM

## 2021-01-28 DIAGNOSIS — E29.1 HYPOGONADISM MALE: ICD-10-CM

## 2021-01-28 LAB
HCT VFR BLD CALC: 52.6 % (ref 42–52)
HEMOGLOBIN: 17.3 G/DL (ref 14–18)
TESTOSTERONE TOTAL: 819.3 NG/DL (ref 249–836)

## 2021-02-03 ENCOUNTER — TELEPHONE (OUTPATIENT)
Dept: UROLOGY | Age: 46
End: 2021-02-03

## 2021-02-03 NOTE — TELEPHONE ENCOUNTER
Called patient to let him know he needed to get therapeutic phlebotomy done since his levels were still elevated. He said the [facility] blood [place] and [facility] told him they couldn't do it. I gave him information to the [employer] blood bank and one in [facility] since he is from [place]. He is going to call them and see if they will do that.

## 2021-02-03 NOTE — TELEPHONE ENCOUNTER
Please let Mr. Keyonna Gonzalez know that he needs to give blood at the [facility] [place] [facility] phlebotomy. His H&H is still elevated at 17.3, 52.6. His hemoglobin needs to be under 15 and currently at 17.3. Once he gives blood then he can come back for repeat H&H. After I have that result I will refill his testosterone.

## 2021-02-05 ENCOUNTER — TELEPHONE (OUTPATIENT)
Dept: UROLOGY | Age: 46
End: 2021-02-05

## 2021-02-05 DIAGNOSIS — R71.8 ELEVATED HEMATOCRIT: Primary | ICD-10-CM

## 2021-02-17 DIAGNOSIS — R71.8 ELEVATED HEMATOCRIT: Primary | ICD-10-CM

## 2021-02-23 ENCOUNTER — HOSPITAL ENCOUNTER (OUTPATIENT)
Dept: INFUSION THERAPY | Age: 46
End: 2021-02-23
Payer: COMMERCIAL

## 2021-04-12 ENCOUNTER — TELEPHONE (OUTPATIENT)
Dept: INFUSION THERAPY | Age: 46
End: 2021-04-12

## 2021-04-12 NOTE — TELEPHONE ENCOUNTER
Patient called to cancel up coming apt with Jerris Aase, PA-C on 04/15/2021 and did not wish to reschedule. Georgia Orozco.  Prasanth Fraction